# Patient Record
(demographics unavailable — no encounter records)

---

## 2025-01-03 NOTE — REVIEW OF SYSTEMS
[Anxiety] : anxiety [Negative] : Heme/Lymph [Weight Gain (___ Lbs)] : no recent weight gain [Weight Loss (___ Lbs)] : no recent weight loss [SOB] : no shortness of breath [Dyspnea on exertion] : not dyspnea during exertion [Chest Discomfort] : no chest discomfort [Lower Ext Edema] : no extremity edema [Palpitations] : no palpitations [Syncope] : no syncope [Dizziness] : no dizziness [FreeTextEntry6] : Had a pulmonary evaluation with a questionable bronchodilator response but on no medications now. [FreeTextEntry8] : Kidney stone.  Remote inguinal hernia and maybe a second small hernia exists on the other side. [de-identified] : Still seems a little anxious.

## 2025-01-03 NOTE — HISTORY OF PRESENT ILLNESS
[FreeTextEntry1] : February 19, 2016. Patient is here for evaluation. For the most part he has been extremely healthy other than hernia surgery in 2013 and a history of kidney stones. He thinks he may have had a stress test in the past. He also had back surgery for a disc at L4-5 about 8 or 10 years ago. On a recent visit he was noted to have frequent ectopy although by the time the EKG was done there were no APCs or VPCs. He denies palpitations, chest pain, shortness of breath etc. He has never had syncope or near syncope. He is on no medications. He has not been under a lot of stress as he is a retired . He is here together with his wife. Upon reviewing his labs he does have an abnormal lipid profile with an LDL of around 140 but his HDL is 56. He seems to be on a fairly good low-fat diet. He does admit to being under a little stress and anxious since he was told he needs to see a cardiologist. February 26, 2016.  Patient returned for echocardiogram.  Normal LV and RV function with LVEF 61%.  Mild MR.  Aortic root 3.9 cm and ascending aorta 3.6 cm.  Minimal TR. normal pericardium without effusion.  January 3, 2025.  Patient returns today almost 9 years later.  Has continued to be followed by Dr. Rudd.  His wife who is also my patient had a coronary artery calcium score of 182.  The patient went for a calcium score in February of last year and it came back at only 4 although all forward in the left main.  He was placed on rosuvastatin 10 mg but got leg cramps right away and stopped it and has not been on anything since.  There still is no family history of coronary disease.  He has no exertional symptoms.  Only recent hospitalization and history has been for a kidney stone.

## 2025-01-03 NOTE — PHYSICAL EXAM
[General Appearance - Well Developed] : well developed [Normal Appearance] : normal appearance [Well Groomed] : well groomed [General Appearance - Well Nourished] : well nourished [No Deformities] : no deformities [General Appearance - In No Acute Distress] : no acute distress [Normal Conjunctiva] : the conjunctiva exhibited no abnormalities [Eyelids - No Xanthelasma] : the eyelids demonstrated no xanthelasmas [Normal Oral Mucosa] : normal oral mucosa [No Oral Pallor] : no oral pallor [No Oral Cyanosis] : no oral cyanosis [Normal Jugular Venous A Waves Present] : normal jugular venous A waves present [Normal Jugular Venous V Waves Present] : normal jugular venous V waves present [No Jugular Venous Camarena A Waves] : no jugular venous camarena A waves [Heart Rate And Rhythm] : heart rate and rhythm were normal [Heart Sounds] : normal S1 and S2 [Respiration, Rhythm And Depth] : normal respiratory rhythm and effort [Exaggerated Use Of Accessory Muscles For Inspiration] : no accessory muscle use [Auscultation Breath Sounds / Voice Sounds] : lungs were clear to auscultation bilaterally [Abdomen Soft] : soft [Abdomen Tenderness] : non-tender [Abdomen Mass (___ Cm)] : no abdominal mass palpated [Abnormal Walk] : normal gait [Gait - Sufficient For Exercise Testing] : the gait was sufficient for exercise testing [Nail Clubbing] : no clubbing of the fingernails [Cyanosis, Localized] : no localized cyanosis [Petechial Hemorrhages (___cm)] : no petechial hemorrhages [Skin Color & Pigmentation] : normal skin color and pigmentation [] : no rash [No Venous Stasis] : no venous stasis [Skin Lesions] : no skin lesions [No Skin Ulcers] : no skin ulcer [No Xanthoma] : no  xanthoma was observed [Oriented To Time, Place, And Person] : oriented to person, place, and time [Affect] : the affect was normal [Mood] : the mood was normal [FreeTextEntry1] : Only mildly anxious

## 2025-01-03 NOTE — ASSESSMENT
[FreeTextEntry1] : (2016) Healthy 56-year-old with mild hyperlipidemia and frequent ectopy on recent office visit. Exam is unremarkable except possibly a faint aortic insufficiency murmur. EKG in December was within normal limits. I reassured the patient about the unlikelihood of significant cardiac disease but did agree that in the face of frequent ectopy LV function should be assessed and underlying occult cardiomyopathy ruled out with an echocardiogram. This will help the patient's reassurance as well. I did discuss low-fat diet and did recommend that if his LDL remains this high over the next 6 months to a year despite diet, he would be a candidate for statin therapy.   Patient returns to see me after 9 years.  Lipid profile remains abnormal with HDL consistently in the low 50s and LDL ranging from 127-152, mostly in the 130s and 140s.  He had a coronary artery calcium score in February 2024 which came back at only 4 although I will forward the left main.  He was tried on rosuvastatin 10 mg but immediately developed leg cramps and stopped it so currently the patient is not on a statin.  Discussed the pros and cons with the patient and his wife and I feel we clearly should try to get his LDL below 100 at least.  No need for aspirin therapy.  At this point I would also send off other cardiac risk factors such as lipoprotein (a) and cardiac C-reactive protein along with an NMR lipid profile.  Based on all those findings we will have a better idea of how aggressive to be with his lipid management.

## 2025-01-03 NOTE — DISCUSSION/SUMMARY
[EKG obtained to assist in diagnosis and management of assessed problem(s)] : EKG obtained to assist in diagnosis and management of assessed problem(s) [FreeTextEntry1] : Currently patient healthy asymptomatic with normal exam and normal ECG with sinus rhythm at 60.  No real risk factors for coronary disease other than high LDL which has persisted now for 9 years and he was intolerant of the 1 statin that was tried.  Low coronary artery calcium score last year but not 0 and maybe slight concern that it is in the left main.  Will check further labs for risk factors and then we will decide on which statin to treat him with and which LDL level to aim for.  No need for stress testing at this point.  He had mild MR on his previous echo 9 years ago but I do not hear a murmur today and he has no symptoms.  If he has an abnormal proBNP I would bring him back for an echo as well.

## 2025-01-03 NOTE — REASON FOR VISIT
[FreeTextEntry1] : 56-year-old man referred because of frequent ectopy Now returns for evaluation at the age of 65.

## 2025-02-18 NOTE — HISTORY OF PRESENT ILLNESS
[de-identified] : 64 y/o M presents for initial PE  .Recently saw Cardio and started on Atorvastatin

## 2025-02-18 NOTE — HEALTH RISK ASSESSMENT
[Excellent] : ~his/her~  mood as  excellent [No] : No [No falls in past year] : Patient reported no falls in the past year [0] : 2) Feeling down, depressed, or hopeless: Not at all (0) [PHQ-2 Negative - No further assessment needed] : PHQ-2 Negative - No further assessment needed [de-identified] : walk daily fish daily [[id number]] : 0

## 2025-07-03 NOTE — PHYSICAL EXAM
[Alert] : alert [Oriented to Person] : oriented to person [Oriented to Place] : oriented to place [Oriented to Time] : oriented to time [Calm] : calm [de-identified] : Well-developed well-nourished man in no distress [de-identified] : Normoactive bowel sounds, no hepatosplenomegaly, no masses, non-tender.  Left lower quadrant incision consistent with history of preperitoneal left inguinal hernia repair [de-identified] : Bilateral descended testicles, no tenderness or defect noted either groin with cough and Valsalva. [de-identified] : Understood consultation

## 2025-07-03 NOTE — HISTORY OF PRESENT ILLNESS
[de-identified] : Mr. Lawson is a 65 yo male here for a consultation for possible recurrent left inguinal hernia.   US 05/03/24- Possible hernia at the pt's site of concern in the left lower quadrant.   Patient had a preperitoneal left inguinal hernia repair with Dr. Howe, in 2013. Patient was seen in 2019 for a possible small right inguinal hernia but is asymptomatic on both sides at this time.  Patient reports worsening of discomfort over the last.......weeks/month in abdomen exacerbated by coughing, straining, sneezing, lifting. Patient reports expansion of bulging/swelling that is reducible and associated with intermittent non-radiating pain. Relief with self-reduction/low activity/reclining. Denies nausea, vomiting, fever, chills, pain out of proportion, chest pain, HA, dizziness. Denies constipation, difficulty with bowel movements or urination. Denies shortness of breath, EDWARDS, or difficulty breathing, patient can walk / climb 2 flights without stopping. Denies hx of blood clots or bleeding problems.

## 2025-07-03 NOTE — PLAN
[FreeTextEntry1] : The nature and risks of hernia were discussed as were signs and symptoms of incarceration, obstruction and strangulation as possible risks of observation. A thorough preoperative education has been performed about the role and the different surgical options have been discussed with patient. Risks, benefits and alternatives have been discussed and patient verbalizes understanding. Laparoscopic/Robotic/Open repair of inguinal hernia/abdominal hernia was discussed with the patient at length. The risks, benefits, and alternatives of the types of repair as well as to the use of mesh were discussed and all questions answered. Risks of hernia repair include, but are not limited to infection, bleeding, recurrence and injury to adjacent structures and nerves. Advised patient about possible risk of future complications if hernia worsens or goes untreated. Patient would like to proceed with RA ventral repair with mesh.

## 2025-07-03 NOTE — ASSESSMENT
[FreeTextEntry1] : Pain on left groin after heavy lifting and ultrasound consistent with possibility of recurrent left inguinal hernia, asymptomatic with no palpable defect at this time. No definite evidence clinically of right inguinal hernia.

## 2025-07-22 NOTE — REASON FOR VISIT
[FreeTextEntry1] : 56-year-old man referred because of frequent ectopy Now returns for evaluation at the age of 66.

## 2025-07-22 NOTE — REVIEW OF SYSTEMS
[Weight Gain (___ Lbs)] : no recent weight gain [SOB] : no shortness of breath [Dyspnea on exertion] : not dyspnea during exertion [Chest Discomfort] : no chest discomfort [Lower Ext Edema] : no extremity edema [Palpitations] : no palpitations [Syncope] : no syncope [Dizziness] : no dizziness [Anxiety] : anxiety [Negative] : Heme/Lymph [FreeTextEntry5] : Calcium score only 4 but in the left main; ascending aorta 4.0 cm. [FreeTextEntry6] : Had a pulmonary evaluation with a questionable bronchodilator response but on no medications now. [FreeTextEntry8] : Kidney stone.  Remote inguinal hernia and maybe a second small hernia exists on the other side. [de-identified] : Still seems a little anxious.

## 2025-07-22 NOTE — PHYSICAL EXAM
Patient contacted office to advise that after leaving his appointment  yesterday when he would go to stand up he would experience dizziness when he would go to stand up - patient stated that he was not standing up fast, he was standing up as he always does. Please advise. [General Appearance - Well Developed] : well developed [Normal Appearance] : normal appearance [Well Groomed] : well groomed [General Appearance - Well Nourished] : well nourished [No Deformities] : no deformities [General Appearance - In No Acute Distress] : no acute distress [Normal Conjunctiva] : the conjunctiva exhibited no abnormalities [Eyelids - No Xanthelasma] : the eyelids demonstrated no xanthelasmas [Normal Oral Mucosa] : normal oral mucosa [No Oral Pallor] : no oral pallor [No Oral Cyanosis] : no oral cyanosis [Normal Jugular Venous A Waves Present] : normal jugular venous A waves present [Normal Jugular Venous V Waves Present] : normal jugular venous V waves present [No Jugular Venous Camarena A Waves] : no jugular venous camarena A waves [Heart Rate And Rhythm] : heart rate and rhythm were normal [Heart Sounds] : normal S1 and S2 [Respiration, Rhythm And Depth] : normal respiratory rhythm and effort [Exaggerated Use Of Accessory Muscles For Inspiration] : no accessory muscle use [Auscultation Breath Sounds / Voice Sounds] : lungs were clear to auscultation bilaterally [Abdomen Soft] : soft [Abdomen Tenderness] : non-tender [Abdomen Mass (___ Cm)] : no abdominal mass palpated [Abnormal Walk] : normal gait [Gait - Sufficient For Exercise Testing] : the gait was sufficient for exercise testing [Nail Clubbing] : no clubbing of the fingernails [Cyanosis, Localized] : no localized cyanosis [Petechial Hemorrhages (___cm)] : no petechial hemorrhages [Skin Color & Pigmentation] : normal skin color and pigmentation [] : no rash [No Venous Stasis] : no venous stasis [Skin Lesions] : no skin lesions [No Skin Ulcers] : no skin ulcer [No Xanthoma] : no  xanthoma was observed [Oriented To Time, Place, And Person] : oriented to person, place, and time [Affect] : the affect was normal [Mood] : the mood was normal [FreeTextEntry1] : Only mildly anxious

## 2025-07-22 NOTE — DISCUSSION/SUMMARY
[FreeTextEntry1] : Routine labs sent including lipids, CK, thyroid functions.  If all okay tentative follow-up 6 months.  Will follow-up proBNP as well. [EKG obtained to assist in diagnosis and management of assessed problem(s)] : EKG obtained to assist in diagnosis and management of assessed problem(s)

## 2025-07-22 NOTE — HISTORY OF PRESENT ILLNESS
[FreeTextEntry1] : February 19, 2016. Patient is here for evaluation. For the most part he has been extremely healthy other than hernia surgery in 2013 and a history of kidney stones. He thinks he may have had a stress test in the past. He also had back surgery for a disc at L4-5 about 8 or 10 years ago. On a recent visit he was noted to have frequent ectopy although by the time the EKG was done there were no APCs or VPCs. He denies palpitations, chest pain, shortness of breath etc. He has never had syncope or near syncope. He is on no medications. He has not been under a lot of stress as he is a retired . He is here together with his wife. Upon reviewing his labs he does have an abnormal lipid profile with an LDL of around 140 but his HDL is 56. He seems to be on a fairly good low-fat diet. He does admit to being under a little stress and anxious since he was told he needs to see a cardiologist. February 26, 2016.  Patient returned for echocardiogram.  Normal LV and RV function with LVEF 61%.  Mild MR.  Aortic root 3.9 cm and ascending aorta 3.6 cm.  Minimal TR. normal pericardium without effusion.  January 3, 2025.  Patient returns today almost 9 years later.  Has continued to be followed by Dr. Rudd.  His wife who is also my patient had a coronary artery calcium score of 182.  The patient went for a calcium score in February of last year and it came back at only 4 although all forward in the left main.  He was placed on rosuvastatin 10 mg but got leg cramps right away and stopped it and has not been on anything since.  There still is no family history of coronary disease.  He has no exertional symptoms.  Only recent hospitalization and history has been for a kidney stone. Reviewed labs with patient. Calcium score of 4 in the left main. Cardiac C-reactive protein elevated over 5. NMR lipid profile borderline with HDL at the borderline of being protective, small LDL particles higher than normal. Lipoprotein a normal. He had been tried on rosuvastatin but got bad leg cramps right away and stopped it. I explained that he probably should be on a statin based on these findings and we will try atorvastatin 10 mg to start and if he tolerates that he can follow-up with me in 4 months. Coronary artery calcium score did check the ascending aorta and it was 4.0 cm virtually unchanged from 2016 when it was 3.9. July 22, 2025.  Patient returns for follow-up with his wife.  No complaints although he did forget his Lipitor for about a week around July 4 and has been back on it since then.  He does get some cramps but so far tolerable.  He continues to lose weight now down 7 pounds since I saw him in January; he did cut out cookies and cakes and really seemed surprised about the weight loss.  No other significant symptoms of concern.

## 2025-07-22 NOTE — ASSESSMENT
[FreeTextEntry1] : Currently (January 3, 2025) patient healthy asymptomatic with normal exam and normal ECG with sinus rhythm at 60. No real risk factors for coronary disease other than high LDL which has persisted now for 9 years and he was intolerant of the 1 statin that was tried. Low coronary artery calcium score last year but not 0 and maybe slight concern that it is in the left main. Will check further labs for risk factors and then we will decide on which statin to treat him with and which LDL level to aim for. No need for stress testing at this point. He had mild MR on his previous echo 9 years ago but I do not hear a murmur today and he has no symptoms. If he has an abnormal proBNP I would bring him back for an echo as well.  Patient returns with wife July 22, 2025.  No symptoms and no real complaints except mild cramping from the Lipitor 10 mg.  He was concerned about his weight loss but he did cut out all cookies and cakes so that might be a reasonable explanation.  Labs will clearly be checked including thyroid function.  He was off the Lipitor for about a week and then around July 4 and we will have to take that into consideration.  CK will be checked as well.  His aortic root was 4.0 cm on his coronary calcium score and was 3.9 cm in 2016 so not much concerned there.  Will probably do another echo in 1 or 2 years as she also has mild MR.  His wife asked about a stress test and I explained why it is not necessary when your calcium score is only 4.

## 2025-07-24 NOTE — HISTORY OF PRESENT ILLNESS
[de-identified] : Mr. Lawson is a 67 yo male here for a consultation for possible recurrent left inguinal hernia.   US 05/03/24- Possible hernia at the pt's site of concern in the left lower quadrant.   Patient had a preperitoneal left inguinal hernia repair with Dr. Howe, in 2013. Patient was seen in 2019 for a possible small right inguinal hernia but is asymptomatic on both sides at this time.  Patient reports worsening of discomfort over the last few weeks in groin exacerbated by coughing, straining, sneezing, lifting. Patient reports expansion of bulging/swelling that is reducible and associated with intermittent non-radiating pain. Relief with self-reduction/low activity/reclining. Denies nausea, vomiting, fever, chills, pain out of proportion, chest pain, HA, dizziness. Denies constipation, difficulty with bowel movements or urination. Denies shortness of breath, EWDARDS, or difficulty breathing, patient can walk / climb 2 flights without stopping. Denies hx of blood clots or bleeding problems.  CT pelvis on 7/11/25: BLADDER: Within normal limits. REPRODUCTIVE ORGANS: Heterogeneous prominent prostate with calcification. Trace right scrotal hydrocele with mild scrotal wall thickening LYMPH NODES: No pelvic lymphadenopathy. VISUALIZED PORTIONS: ABDOMINAL ORGANS: Cyst noted in the kidney. BOWEL: Within normal limits. PERITONEUM/RETROPERITONEUM: Within normal limits. VESSELS: Atherosclerotic changes. ABDOMINAL WALL: Fat-containing right inguinal hernia. Status post left inguinal hernia repair without recurrence BONES: Degenerative changes. IMPRESSION: Small fat-containing right inguinal hernia. Status post left inguinal hernia repair without recurrence.

## 2025-07-24 NOTE — PHYSICAL EXAM
[de-identified] : Well-developed well-nourished man in no distress [de-identified] : Normoactive bowel sounds, no hepatosplenomegaly, no masses, non-tender.  Left lower quadrant incision consistent with history of preperitoneal left inguinal hernia repair [de-identified] : Bilateral descended testicles, no tenderness or defect noted either groin with cough and Valsalva.  Small right inguinal hernia without content. [de-identified] : Understood consultation

## 2025-07-24 NOTE — PHYSICAL EXAM
[de-identified] : Well-developed well-nourished man in no distress [de-identified] : Normoactive bowel sounds, no hepatosplenomegaly, no masses, non-tender.  Left lower quadrant incision consistent with history of preperitoneal left inguinal hernia repair [de-identified] : Bilateral descended testicles, no tenderness or defect noted either groin with cough and Valsalva.  Small right inguinal hernia without content. [de-identified] : Understood consultation

## 2025-07-24 NOTE — ASSESSMENT
[FreeTextEntry1] : Pain on left groin after heavy lifting and ultrasound consistent with possibility of recurrent left inguinal hernia, asymptomatic with no palpable defect at this time.   CT Small fat-containing right inguinal hernia. Status post left inguinal hernia repair without recurrence. CT consistant with exam.

## 2025-07-24 NOTE — HISTORY OF PRESENT ILLNESS
[de-identified] : Mr. Lawson is a 65 yo male here for a consultation for possible recurrent left inguinal hernia.   US 05/03/24- Possible hernia at the pt's site of concern in the left lower quadrant.   Patient had a preperitoneal left inguinal hernia repair with Dr. Howe, in 2013. Patient was seen in 2019 for a possible small right inguinal hernia but is asymptomatic on both sides at this time.  Patient reports worsening of discomfort over the last few weeks in groin exacerbated by coughing, straining, sneezing, lifting. Patient reports expansion of bulging/swelling that is reducible and associated with intermittent non-radiating pain. Relief with self-reduction/low activity/reclining. Denies nausea, vomiting, fever, chills, pain out of proportion, chest pain, HA, dizziness. Denies constipation, difficulty with bowel movements or urination. Denies shortness of breath, EDWARDS, or difficulty breathing, patient can walk / climb 2 flights without stopping. Denies hx of blood clots or bleeding problems.  CT pelvis on 7/11/25: BLADDER: Within normal limits. REPRODUCTIVE ORGANS: Heterogeneous prominent prostate with calcification. Trace right scrotal hydrocele with mild scrotal wall thickening LYMPH NODES: No pelvic lymphadenopathy. VISUALIZED PORTIONS: ABDOMINAL ORGANS: Cyst noted in the kidney. BOWEL: Within normal limits. PERITONEUM/RETROPERITONEUM: Within normal limits. VESSELS: Atherosclerotic changes. ABDOMINAL WALL: Fat-containing right inguinal hernia. Status post left inguinal hernia repair without recurrence BONES: Degenerative changes. IMPRESSION: Small fat-containing right inguinal hernia. Status post left inguinal hernia repair without recurrence.

## 2025-07-24 NOTE — PLAN
[FreeTextEntry1] : Supportive care on the left.  NSAIDs ice as needed. Right inguinal hernia repair with mesh when symptomatic.  Patient knows to return if symptoms develop on the right for discussion of robot-assisted laparoscopic right inguinal hernia repair with mesh.